# Patient Record
Sex: MALE | Race: WHITE | Employment: FULL TIME | URBAN - METROPOLITAN AREA
[De-identification: names, ages, dates, MRNs, and addresses within clinical notes are randomized per-mention and may not be internally consistent; named-entity substitution may affect disease eponyms.]

---

## 2018-04-10 ENCOUNTER — HOSPITAL ENCOUNTER (OUTPATIENT)
Age: 48
Discharge: HOME | End: 2018-04-12

## 2023-02-06 PROBLEM — R19.8 ABDOMINAL FULLNESS: Status: ACTIVE | Noted: 2023-02-06

## 2023-02-06 PROBLEM — I10 HYPERTENSION: Status: ACTIVE | Noted: 2023-02-06

## 2023-02-06 PROBLEM — M25.512 LEFT SHOULDER PAIN: Status: ACTIVE | Noted: 2023-02-06

## 2023-02-06 PROBLEM — G43.909 MIGRAINES: Status: ACTIVE | Noted: 2023-02-06

## 2023-02-06 PROBLEM — M25.552 LEFT HIP PAIN: Status: ACTIVE | Noted: 2023-02-06

## 2023-02-06 RX ORDER — AMLODIPINE BESYLATE 10 MG/1
1 TABLET ORAL DAILY
COMMUNITY
Start: 2021-05-07 | End: 2024-03-07 | Stop reason: SDUPTHER

## 2023-02-06 RX ORDER — SUMATRIPTAN SUCCINATE 25 MG/1
25 TABLET ORAL ONCE AS NEEDED
COMMUNITY
Start: 2021-05-07 | End: 2024-03-13 | Stop reason: SDUPTHER

## 2023-04-03 ENCOUNTER — OFFICE VISIT (OUTPATIENT)
Dept: PRIMARY CARE | Facility: CLINIC | Age: 53
End: 2023-04-03
Payer: COMMERCIAL

## 2023-04-03 VITALS
SYSTOLIC BLOOD PRESSURE: 140 MMHG | DIASTOLIC BLOOD PRESSURE: 80 MMHG | TEMPERATURE: 97 F | HEART RATE: 84 BPM | WEIGHT: 193.3 LBS | OXYGEN SATURATION: 97 % | BODY MASS INDEX: 28.55 KG/M2

## 2023-04-03 DIAGNOSIS — M54.12 CERVICAL RADICULOPATHY: Primary | ICD-10-CM

## 2023-04-03 DIAGNOSIS — Z12.11 ENCOUNTER FOR SCREENING COLONOSCOPY: ICD-10-CM

## 2023-04-03 DIAGNOSIS — G89.29 CHRONIC LEFT SHOULDER PAIN: ICD-10-CM

## 2023-04-03 DIAGNOSIS — C44.41 BASAL CELL CARCINOMA (BCC) OF SCALP: ICD-10-CM

## 2023-04-03 DIAGNOSIS — M25.512 CHRONIC LEFT SHOULDER PAIN: ICD-10-CM

## 2023-04-03 PROBLEM — S06.6XAA SUBARACHNOID HEMORRHAGE, TRAUMATIC (MULTI): Status: ACTIVE | Noted: 2020-01-31

## 2023-04-03 PROBLEM — S06.4XAA: Status: ACTIVE | Noted: 2020-01-31

## 2023-04-03 PROCEDURE — 3079F DIAST BP 80-89 MM HG: CPT

## 2023-04-03 PROCEDURE — 3077F SYST BP >= 140 MM HG: CPT

## 2023-04-03 PROCEDURE — 99213 OFFICE O/P EST LOW 20 MIN: CPT

## 2023-04-03 ASSESSMENT — ENCOUNTER SYMPTOMS
DEPRESSION: 0
WEAKNESS: 0
DIZZINESS: 0
ABDOMINAL PAIN: 0
OCCASIONAL FEELINGS OF UNSTEADINESS: 0
GASTROINTESTINAL NEGATIVE: 1
HEADACHES: 0
MUSCULOSKELETAL NEGATIVE: 1
LOSS OF SENSATION IN FEET: 0
UNEXPECTED WEIGHT CHANGE: 0
FEVER: 0
PAIN: 1
ACTIVITY CHANGE: 0
ENDOCRINE NEGATIVE: 1
ALLERGIC/IMMUNOLOGIC NEGATIVE: 1
PSYCHIATRIC NEGATIVE: 1
RESPIRATORY NEGATIVE: 1
FATIGUE: 0
CARDIOVASCULAR NEGATIVE: 1
SHORTNESS OF BREATH: 0
HYPERTENSION: 1

## 2023-04-03 NOTE — PROGRESS NOTES
Subjective   Patient ID: Reynaldo Vail is a 52 y.o. male who presents for Hypertension (Reynaldo is here for his hypertension. ) and Pain (When he is sleeping he gets pins an needles. When he fold his arms across his body his arms go numb.).  Taking Amlodipine about 4-5 days weekly with good improvement of BP today  Trouble swallowing medications with warm liquids, no issues with cold liquids.     Left arm numbness when sleeping on it, Hx of GH separation with clavicle fracture.   Pins and needles sensation while sitting on left side.     Bilateral leg swelling early in the day, improves with exercise  Pain level is 0 out of 10  Improves with walking    Skin lesion right side Forehead region, concern for basal cell carcinoma    Hypertension  Pertinent negatives include no chest pain, headaches or shortness of breath.   Pain  Pertinent negatives include no abdominal pain, chest pain, fatigue, fever, headaches, shortness of breath or weakness.       Vitals:    04/03/23 0847   BP: 140/80   Pulse: 84   Temp: 36.1 °C (97 °F)   SpO2: 97%       Review of Systems   Constitutional:  Negative for activity change, fatigue, fever and unexpected weight change.   HENT: Negative.     Respiratory: Negative.  Negative for shortness of breath.    Cardiovascular: Negative.  Negative for chest pain.   Gastrointestinal: Negative.  Negative for abdominal pain.   Endocrine: Negative.    Musculoskeletal: Negative.    Skin: Negative.    Allergic/Immunologic: Negative.    Neurological:  Negative for dizziness, weakness and headaches.   Psychiatric/Behavioral: Negative.         Objective   Physical Exam  Constitutional:       Appearance: Normal appearance.   HENT:      Head: Normocephalic.      Mouth/Throat:      Mouth: Mucous membranes are moist.   Cardiovascular:      Rate and Rhythm: Normal rate and regular rhythm.      Pulses: Normal pulses.      Heart sounds: Normal heart sounds. No murmur heard.     No friction rub. No gallop.   Pulmonary:       Effort: Pulmonary effort is normal. No respiratory distress.      Breath sounds: Normal breath sounds. No wheezing.   Abdominal:      General: Bowel sounds are normal. There is no distension.      Palpations: Abdomen is soft.      Tenderness: There is no abdominal tenderness.   Musculoskeletal:         General: No deformity. Normal range of motion.   Skin:     General: Skin is warm and dry.      Capillary Refill: Capillary refill takes less than 2 seconds.   Neurological:      General: No focal deficit present.      Mental Status: He is alert and oriented to person, place, and time.   Psychiatric:         Mood and Affect: Mood normal.         Assessment/Plan   Problem List Items Addressed This Visit          Musculoskeletal    Left shoulder pain     Other Visit Diagnoses       Cervical radiculopathy    -  Primary    Encounter for screening colonoscopy        Basal cell carcinoma (BCC) of scalp                Time Spent  Prep time on day of patient encounter: 10 minutes  Time spent directly with patient, family or caregiver: 15 minutes  Additional Time Spent on Patient Care Activities: 10 minutes  Documentation Time: 10 minutes  Other Time Spent: 0 minutes  Total: 45 minutes        Thank you for coming in today, please call my office if you have any concerns or questions.     Rhett BREAUX, CNP

## 2023-04-03 NOTE — PATIENT INSTRUCTIONS
Start physical therapy again for Cervical radiculopathy as well as strengthening the back.   Plan for Colonoscopy in the near future    See in another 3 months, if no improvement with PT, see in 1 month.     Thank you for coming in today, if any questions or concerns arise, please call my office.     Rhett Key, SAEID-CNP

## 2023-06-16 ENCOUNTER — TELEPHONE (OUTPATIENT)
Dept: PRIMARY CARE | Facility: CLINIC | Age: 53
End: 2023-06-16
Payer: COMMERCIAL

## 2023-07-03 ENCOUNTER — APPOINTMENT (OUTPATIENT)
Dept: PRIMARY CARE | Facility: CLINIC | Age: 53
End: 2023-07-03
Payer: COMMERCIAL

## 2023-12-28 ENCOUNTER — APPOINTMENT (OUTPATIENT)
Dept: PRIMARY CARE | Facility: CLINIC | Age: 53
End: 2023-12-28
Payer: COMMERCIAL

## 2024-03-07 ENCOUNTER — OFFICE VISIT (OUTPATIENT)
Dept: PRIMARY CARE | Facility: CLINIC | Age: 54
End: 2024-03-07
Payer: COMMERCIAL

## 2024-03-07 VITALS
WEIGHT: 191 LBS | HEART RATE: 61 BPM | HEIGHT: 69 IN | DIASTOLIC BLOOD PRESSURE: 80 MMHG | OXYGEN SATURATION: 98 % | BODY MASS INDEX: 28.29 KG/M2 | SYSTOLIC BLOOD PRESSURE: 130 MMHG | TEMPERATURE: 96.8 F

## 2024-03-07 DIAGNOSIS — L57.0 ACTINIC KERATOSIS: ICD-10-CM

## 2024-03-07 DIAGNOSIS — Z00.00 ROUTINE GENERAL MEDICAL EXAMINATION AT A HEALTH CARE FACILITY: Primary | ICD-10-CM

## 2024-03-07 DIAGNOSIS — I10 PRIMARY HYPERTENSION: ICD-10-CM

## 2024-03-07 DIAGNOSIS — Z12.11 COLON CANCER SCREENING: ICD-10-CM

## 2024-03-07 PROCEDURE — 99213 OFFICE O/P EST LOW 20 MIN: CPT

## 2024-03-07 PROCEDURE — 3075F SYST BP GE 130 - 139MM HG: CPT

## 2024-03-07 PROCEDURE — 99396 PREV VISIT EST AGE 40-64: CPT

## 2024-03-07 PROCEDURE — 3079F DIAST BP 80-89 MM HG: CPT

## 2024-03-07 PROCEDURE — 1036F TOBACCO NON-USER: CPT

## 2024-03-07 RX ORDER — AMLODIPINE BESYLATE 10 MG/1
10 TABLET ORAL DAILY
Qty: 90 TABLET | Refills: 3 | Status: SHIPPED | OUTPATIENT
Start: 2024-03-07 | End: 2025-03-07

## 2024-03-07 ASSESSMENT — ENCOUNTER SYMPTOMS
ENDOCRINE NEGATIVE: 1
DIZZINESS: 1
MUSCULOSKELETAL NEGATIVE: 1
LIGHT-HEADEDNESS: 1
ALLERGIC/IMMUNOLOGIC NEGATIVE: 1
CARDIOVASCULAR NEGATIVE: 1
FATIGUE: 0
ACTIVITY CHANGE: 0
UNEXPECTED WEIGHT CHANGE: 0
SHORTNESS OF BREATH: 0
GASTROINTESTINAL NEGATIVE: 1
SLEEP DISTURBANCE: 1
RESPIRATORY NEGATIVE: 1
WEAKNESS: 0
FEVER: 0
ABDOMINAL PAIN: 0
HEADACHES: 1

## 2024-03-07 ASSESSMENT — PROMIS GLOBAL HEALTH SCALE
RATE_SOCIAL_SATISFACTION: VERY GOOD
RATE_GENERAL_HEALTH: VERY GOOD
RATE_MENTAL_HEALTH: VERY GOOD
CARRYOUT_SOCIAL_ACTIVITIES: VERY GOOD
RATE_PHYSICAL_HEALTH: GOOD
CARRYOUT_PHYSICAL_ACTIVITIES: COMPLETELY
RATE_QUALITY_OF_LIFE: VERY GOOD
RATE_AVERAGE_PAIN: 4
EMOTIONAL_PROBLEMS: RARELY

## 2024-03-07 NOTE — PROGRESS NOTES
Subjective   Patient ID: Reynaldo Vail is a 53 y.o. male who presents for Annual Exam (Reynaldo is here for his annual exam. Would like to discuss getting disbility paperwork filled out to work form home. ).  Plans on going back to work 4 days out of the week.     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  --no limitations to work from home, can work on Zoom Calls.  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    Patient states he had lab work done at employer and declines further lab work  I did ask if patient had PSA drawn he is unsure  Care gaps are met        Vitals:    03/07/24 0815   BP: 130/80   Pulse: 61   Temp: 36 °C (96.8 °F)   SpO2: 98%       Review of Systems   Constitutional:  Negative for activity change, fatigue, fever and unexpected weight change.   HENT: Negative.     Respiratory: Negative.  Negative for shortness of breath.    Cardiovascular: Negative.  Negative for chest pain.   Gastrointestinal: Negative.  Negative for abdominal pain.   Endocrine: Negative.    Musculoskeletal: Negative.    Skin: Negative.    Allergic/Immunologic: Negative.    Neurological:  Positive for dizziness, light-headedness and headaches. Negative for weakness.   Psychiatric/Behavioral:  Positive for sleep disturbance.        Objective   Physical Exam  Vitals and nursing note reviewed.   Constitutional:       Appearance: Normal appearance.   HENT:      Head: Normocephalic.        Comments: Skin lesion of forehead, dermatology referral     Mouth/Throat:      Mouth: Mucous membranes are moist.   Cardiovascular:      Rate and Rhythm: Normal rate and  regular rhythm.      Pulses: Normal pulses.      Heart sounds: Normal heart sounds. No murmur heard.     No friction rub. No gallop.   Pulmonary:      Effort: Pulmonary effort is normal. No respiratory distress.      Breath sounds: Normal breath sounds. No wheezing.   Abdominal:      General: Bowel sounds are normal. There is no distension.      Palpations: Abdomen is soft.      Tenderness: There is no abdominal tenderness.   Musculoskeletal:         General: No deformity. Normal range of motion.   Skin:     General: Skin is warm and dry.      Capillary Refill: Capillary refill takes less than 2 seconds.             Comments: Hemangiomas multiple    Neurological:      General: No focal deficit present.      Mental Status: He is alert and oriented to person, place, and time.   Psychiatric:         Mood and Affect: Mood normal.         Assessment/Plan   Problem List Items Addressed This Visit       Hypertension    Relevant Medications    amLODIPine (Norvasc) 10 mg tablet     Other Visit Diagnoses       Routine general medical examination at a health care facility    -  Primary    Actinic keratosis        Relevant Orders    Referral to Dermatology    Colon cancer screening        Relevant Orders    Colonoscopy Screening; Average Risk Patient                 Thank you for coming in today, please call my office if you have any concerns or questions.     Rhett BREAUX, CNP

## 2024-03-07 NOTE — PATIENT INSTRUCTIONS
Please bring in [facility] blood work    Colonoscopy ordered  See Susan Mayne for Skin Check in [place]    Thank you for coming in today, if any questions or concerns arise, please call my office.   SAEID Costello-CNP

## 2024-03-08 DIAGNOSIS — Z12.11 SCREEN FOR COLON CANCER: ICD-10-CM

## 2024-03-08 RX ORDER — SODIUM PICOSULFATE, MAGNESIUM OXIDE, AND ANHYDROUS CITRIC ACID 12; 3.5; 1 G/175ML; G/175ML; MG/175ML
LIQUID ORAL
Qty: 350 ML | Refills: 0 | Status: SHIPPED | OUTPATIENT
Start: 2024-03-08

## 2024-03-13 DIAGNOSIS — G43.809 OTHER MIGRAINE WITHOUT STATUS MIGRAINOSUS, NOT INTRACTABLE: Primary | ICD-10-CM

## 2024-03-13 RX ORDER — SUMATRIPTAN SUCCINATE 25 MG/1
25 TABLET ORAL ONCE AS NEEDED
Qty: 9 TABLET | Refills: 0 | Status: SHIPPED | OUTPATIENT
Start: 2024-03-13

## 2024-06-17 ENCOUNTER — ANESTHESIA EVENT (OUTPATIENT)
Dept: ANESTHESIOLOGY | Facility: HOSPITAL | Age: 54
End: 2024-06-17

## 2024-06-17 ENCOUNTER — PRE-ADMISSION TESTING (OUTPATIENT)
Dept: PREADMISSION TESTING | Facility: HOSPITAL | Age: 54
End: 2024-06-17
Payer: COMMERCIAL

## 2024-06-17 NOTE — ANESTHESIA PREPROCEDURE EVALUATION
Patient: Reynaldo Vail    Procedure Information    Date: 06/17/24  Reason: PAT         Relevant Problems   Cardiac   (+) Hypertension      Neuro   (+) Migraines   (+) Traumatic intracranial epidural hematoma (Multi)       Clinical information reviewed:   Tobacco  Allergies  Meds   Med Hx  Surg Hx   Fam Hx          NPO Detail:  No data recorded     PHYSICAL EXAM    Anesthesia Plan    History of general anesthesia?: unknown/emergency  History of complications of general anesthesia?: unknown/emergency    ASA 2     MAC

## 2024-07-08 ENCOUNTER — APPOINTMENT (OUTPATIENT)
Dept: GASTROENTEROLOGY | Facility: HOSPITAL | Age: 54
End: 2024-07-08
Payer: COMMERCIAL

## 2024-10-09 ENCOUNTER — OFFICE VISIT (OUTPATIENT)
Dept: PRIMARY CARE | Facility: CLINIC | Age: 54
End: 2024-10-09
Payer: COMMERCIAL

## 2024-10-09 VITALS
WEIGHT: 196.1 LBS | BODY MASS INDEX: 28.96 KG/M2 | SYSTOLIC BLOOD PRESSURE: 154 MMHG | OXYGEN SATURATION: 97 % | TEMPERATURE: 96.9 F | DIASTOLIC BLOOD PRESSURE: 98 MMHG | HEART RATE: 93 BPM

## 2024-10-09 DIAGNOSIS — J01.00 ACUTE NON-RECURRENT MAXILLARY SINUSITIS: Primary | ICD-10-CM

## 2024-10-09 PROCEDURE — 1036F TOBACCO NON-USER: CPT

## 2024-10-09 PROCEDURE — 3080F DIAST BP >= 90 MM HG: CPT

## 2024-10-09 PROCEDURE — 3077F SYST BP >= 140 MM HG: CPT

## 2024-10-09 PROCEDURE — 99214 OFFICE O/P EST MOD 30 MIN: CPT

## 2024-10-09 RX ORDER — AMOXICILLIN AND CLAVULANATE POTASSIUM 875; 125 MG/1; MG/1
875 TABLET, FILM COATED ORAL 2 TIMES DAILY
Qty: 20 TABLET | Refills: 0 | Status: SHIPPED | OUTPATIENT
Start: 2024-10-09 | End: 2024-10-19

## 2024-10-09 NOTE — PROGRESS NOTES
Subjective   Patient ID: Reynaldo Vail is a 54 y.o. male who presents for Sinus Problem (About a month- started out as clear mucus then turned dark yellowish, two weeks ago turned pus like with some blood, sinus pain and tooth pain//Taking OTC medications with little relief) and Cough (Occasionally productive).  Subjective  Reynaldo Vail is a 54 y.o. male who presents for evaluation of sinus pain. Symptoms include: congestion, cough, facial pain, foul breath, and foul rhinorrhea. Onset of symptoms was a few weeks ago. Symptoms have been gradually worsening since that time. Past history is significant for no history of pneumonia or bronchitis. Patient is a non-smoker.    Objective  [unfilled]     Assessment/Plan  Acute bacterial sinusitis.    abx          Vitals:    10/09/24 1116   BP: (!) 154/98   Pulse: 93   Temp: 36.1 °C (96.9 °F)   SpO2: 97%       Review of Systems    Objective   Physical Exam  Constitutional:       General: He is not in acute distress.     Appearance: Normal appearance.   HENT:      Head: Normocephalic.      Right Ear: Tympanic membrane normal.      Left Ear: Tympanic membrane normal.      Nose: Congestion and rhinorrhea present.      Mouth/Throat:      Pharynx: Oropharyngeal exudate and posterior oropharyngeal erythema present.   Eyes:      Extraocular Movements: Extraocular movements intact.      Pupils: Pupils are equal, round, and reactive to light.   Cardiovascular:      Rate and Rhythm: Normal rate and regular rhythm.   Pulmonary:      Effort: No respiratory distress.   Abdominal:      General: Abdomen is flat. There is no distension.   Skin:     General: Skin is warm and dry.      Capillary Refill: Capillary refill takes less than 2 seconds.   Neurological:      Mental Status: He is alert.   Psychiatric:         Mood and Affect: Mood normal.         Assessment/Plan   Problem List Items Addressed This Visit    None  Visit Diagnoses       Acute non-recurrent maxillary sinusitis    -  Primary     Relevant Medications    amoxicillin-pot clavulanate (Augmentin) 875-125 mg tablet                 Thank you for coming in today, please call my office if you have any concerns or questions.     Rhett BREAUX, CNP

## 2024-11-11 ENCOUNTER — APPOINTMENT (OUTPATIENT)
Dept: DERMATOLOGY | Facility: CLINIC | Age: 54
End: 2024-11-11
Payer: COMMERCIAL

## 2024-11-11 DIAGNOSIS — L21.9 SEBORRHEIC DERMATITIS: ICD-10-CM

## 2024-11-11 DIAGNOSIS — D48.5 NEOPLASM OF UNCERTAIN BEHAVIOR OF SKIN: Primary | ICD-10-CM

## 2024-11-11 DIAGNOSIS — L30.9 DERMATITIS: ICD-10-CM

## 2024-11-11 PROCEDURE — 99213 OFFICE O/P EST LOW 20 MIN: CPT | Performed by: NURSE PRACTITIONER

## 2024-11-11 RX ORDER — FLUOCINONIDE TOPICAL SOLUTION USP, 0.05% 0.5 MG/ML
SOLUTION TOPICAL
Qty: 60 ML | Refills: 3 | Status: SHIPPED | OUTPATIENT
Start: 2024-11-11

## 2024-11-13 NOTE — PROGRESS NOTES
Subjective     Reynaldo Vail is a 54 y.o. male who presents for the following: Skin Check (Waist up skin check- referred by Rhett Key CNP. Pt has lesions of concern to forehead, chest and back. Pt reports personal hx of BCC (~10 years ago). ).     Review of Systems:  No other skin or systemic complaints other than what is documented elsewhere in the note.    The following portions of the chart were reviewed this encounter and updated as appropriate:  Tobacco  Allergies  Meds  Problems  Med Hx  Surg Hx  Fam Hx         Skin Cancer History  No skin cancer on file.      Specialty Problems    None       Objective   Well appearing patient in no apparent distress; mood and affect are within normal limits.    A full examination was performed including scalp, head, eyes, ears, nose, lips, neck, chest, axillae, abdomen, back, buttocks, bilateral upper extremities, bilateral lower extremities, hands, feet, fingers, toes, fingernails, and toenails. All findings within normal limits unless otherwise noted below.    Assessment/Plan   1. Neoplasm of uncertain behavior of skin  Mid Forehead  1.0 cm pink, smooth plaque              Lesion biopsy  Type of biopsy: tangential    Informed consent: discussed and consent obtained    Timeout: patient name, date of birth, surgical site, and procedure verified    Procedure prep:  Patient was prepped and draped  Anesthesia: the lesion was anesthetized in a standard fashion    Anesthetic:  1% lidocaine w/ epinephrine 1-100,000 local infiltration  Instrument used: DermaBlade    Hemostasis achieved with: aluminum chloride    Outcome: patient tolerated procedure well    Post-procedure details: sterile dressing applied and wound care instructions given    Dressing type: petrolatum and bandage      Staff Communication: Dermatology Local Anesthesia: 1 % Lidocaine / Epinephrine - Amount:    Specimen 1 - Dermatopathology- DERM LAB  Differential Diagnosis: r/o nmsc  Check Margins Yes/No?:   "  Comments:    Dermpath Lab: Routine Histopathology (formalin-fixed tissue)    -  Discussed differential with patient.   - Given uncertainty of clinical diagnosis, a biopsy is recommended in clinic today.   - The patient expressed understanding, is in agreement with this plan, and wishes to proceed with biopsy.   - Oral and written wound care instructions provided.   - Advised the patient that the office will call within 2 weeks to discuss biopsy results.     2. Dermatitis    Related Medications  fluocinonide (Lidex) 0.05 % external solution  To scalp daily as needed for itch/inflammation.    3. Seborrheic dermatitis  Scalp  Erythema with overlying greasy scale.    -Discussed the nature of the diagnosis  -There is no \"cure\" for this condition. Treatments will need to be continued long term to treat the condition  -Recommend: Start fluocinonide solution, use as directed.   - Risks, benefits, and side effects discussed. Patient understood and agrees with the plan.       Follow up ior a total body skin exam. Please call me if there are any changes or development of concerning symptoms (lesion/skin condition is changing, bleeding, enlarging, or worsening).        "

## 2024-11-14 LAB
LABORATORY COMMENT REPORT: NORMAL
PATH REPORT.FINAL DX SPEC: NORMAL
PATH REPORT.GROSS SPEC: NORMAL
PATH REPORT.MICROSCOPIC SPEC OTHER STN: NORMAL
PATH REPORT.RELEVANT HX SPEC: NORMAL
PATH REPORT.TOTAL CANCER: NORMAL

## 2024-11-15 DIAGNOSIS — C44.319 BASAL CELL CARCINOMA (BCC) OF FOREHEAD: Primary | ICD-10-CM

## 2024-11-15 NOTE — RESULT ENCOUNTER NOTE
Pt was contacted and notified of malignant results at this time.  Pt is agreeable to treatment plan. No further questions noted.     Mini Robles RN

## 2024-12-09 ENCOUNTER — APPOINTMENT (OUTPATIENT)
Dept: DERMATOLOGY | Facility: CLINIC | Age: 54
End: 2024-12-09
Payer: COMMERCIAL

## 2024-12-09 VITALS — HEART RATE: 73 BPM | SYSTOLIC BLOOD PRESSURE: 148 MMHG | DIASTOLIC BLOOD PRESSURE: 102 MMHG

## 2024-12-09 DIAGNOSIS — C44.319 BASAL CELL CARCINOMA OF FOREHEAD: ICD-10-CM

## 2024-12-09 PROCEDURE — 13131 CMPLX RPR F/C/C/M/N/AX/G/H/F: CPT | Performed by: DERMATOLOGY

## 2024-12-09 PROCEDURE — 99214 OFFICE O/P EST MOD 30 MIN: CPT | Performed by: DERMATOLOGY

## 2024-12-09 PROCEDURE — 17311 MOHS 1 STAGE H/N/HF/G: CPT | Performed by: DERMATOLOGY

## 2024-12-09 NOTE — PROGRESS NOTES
Office Visit Note  Date: 12/9/2024  Surgeon:  Umm Paz MD  Office Location:  [address] [facility]  [address]  [address]  [facility] [address]  Dept: [phone]  Dept Fax: [phone]  Referring Provider: Susan L Mayne, APRN-CNP  [address]  [place] [address]    [facility]   Reynaldo Vail is a 54 y.o. male who presents for the following: MOHS Surgery    According to the patient, the lesion has been present for approximately greater than 1 year at the time of diagnosis.  The lesion is not causing symptoms.  The lesion has not been treated previously.    The patient does not have a pacemaker / defibrillator.  The patient does not have a heart valve / joint replacement.    The patient is not on blood thinners.  The patient does not have a history of hepatitis B or C.  The patient does not have a history of HIV.  The patient does not have a history of immunosuppression (e.g. organ transplantation, malignancy, medications)    Review of Systems:  No other skin or systemic complaints other than what is documented elsewhere in the note.    MEDICAL HISTORY: clinically relevant history including significant past medical history, medications and allergies was reviewed and documented in Epic.    Objective   Well appearing patient in no apparent distress; mood and affect are within normal limits.  Vital signs: See record.  Noted on the Mid Forehead  Is a 1.4 x 1.2 cm scar    The patient confirmed the identified site.    Discussion:  The nature of the diagnosis was explained. The lesion is a skin cancer.  It has a risk of local growth and distant spread. The condition is associated with sun exposure.  Warning signs of non-melanoma skin cancer discussed. Patient was instructed to perform monthly self skin examination.  We recommended that the patient have regular full skin exams given an increased risk of subsequent skin cancers. The patient was instructed to use sun protective  behaviors including use of broad spectrum sunscreens and sun protective clothing to reduce risk of skin cancers.      Risks, benefits, side effects of Mohs surgery were discussed with patient and the patient voiced understanding.  It was explained that even though the cure rate of Mohs is very high it is not 100%. Risks of surgery including but not limited to bleeding, infection, numbness, nerve damage, and scar were reviewed.  Discussion included wound care requirements, activity restrictions, likely scar outcome and time to heal.     After Mohs surgery, the defect may need to be repaired surgically and the scar may be longer than the original lesion.  Reconstruction options, risks, and benefits were reviewed including second intention healing, linear repair (4-1 ratio was explained), local flaps, skin grafts, cartilage grafts and interpolation flaps (the need for multiple surgeries was explained). Possible outcomes were reviewed including likely scar appearance, failure of flap survival, infection, bleeding and the need for revision surgery.     The pathology was reviewed, the photograph was reviewed, and the referring physician's note was reviewed.    Patient elected for Mohs surgery.

## 2024-12-09 NOTE — PROGRESS NOTES
Mohs Surgery Operative Note    Date of Surgery:  12/9/2024  Surgeon:  Umm Paz MD  Office Location:  [address] [facility]  [address]  [address]  [facility] [address]  Dept: [phone]  Dept Fax: [phone]  Referring Provider: Susan L Mayne, APRN-CNP  [address]  [place] [address]      Assessment/Plan   Pre-procedure:   Obtained informed consent: written from patient  The surgical site was identified and confirmed with the patient.     Intra-operative:   Audible time out called at : 8:42 AM 12/09/24  by: Yasmeen De La Fuente MA   Verified patient name, birthdate, site, specimen bottle label & requisition.    The planned procedure(s) was again reviewed with the patient. The risks of bleeding, infection, nerve damage and scarring were reviewed. Written authorization was obtained. The patient identity, surgical site, and planned procedure(s) were verified. The provider acted as both surgeon and pathologist.     Basal cell carcinoma of forehead  Mid Forehead  Mohs surgery  Consent obtained: written    Universal Protocol:  Procedure explained and questions answered to patient or proxy's satisfaction: Yes    Test results available and properly labeled: Yes    Pathology report reviewed: Yes    External notes reviewed: Yes    Photo or diagram used for site identification: Yes    Site/side marked: Yes    Slide independently reviewed by Mohs surgeon: Yes    Immediately prior to procedure a time out was called: Yes    Patient identity confirmed: verbally with patient  Preparation: Patient was prepped and draped in usual sterile fashion      Anticoagulation:  Is the patient taking prescription anticoagulant and/or aspirin prescribed/recommended by a physician? No    Was the anticoagulation regimen changed prior to Mohs? No      Anesthesia:  Anesthesia method: local infiltration  Local anesthetic: lidocaine 2% WITH epi    Procedure Details:  Case ID Number: [id number]  Biopsy  accession number: [id number]  Date of biopsy: 11/11/2024  Pre-Op diagnosis: basal cell carcinoma  BCC subtype: nodular  Surgical site (from skin exam): Mid Forehead  Pre-operative length (cm): 1.4  Pre-operative width (cm): 1.2  Indications for Mohs surgery: anatomic location where tissue conservation is critical  Previously treated? No      Micrographic Surgery Details:  Post-operative length (cm): 1.7  Post-operative width (cm): 1.5  Number of Mohs stages: 1    Stage 1     Comments: The patient was brought into the operating room and placed in the procedure chair in the appropriate position.  The area positive by previous biopsy was identified and confirmed with the patient. The area of clinically obvious tumor was debulked using a curette and/or scalpel as needed. An incision was made following the Mohs approach through the skin. The specimen was taken to the lab, divided into 2 piece(s) and appropriately chromacoded and processed.     Tumor features identified on Mohs section: no tumor identified  Depth of defect: subcutaneous fat    Patient tolerance of procedure: tolerated well, no immediate complications    Reconstruction:  Was the defect reconstructed? Yes    Was reconstruction performed by the same Mohs surgeon? Yes    Setting of reconstruction: outpatient office  When was reconstruction performed? same day  Type of reconstruction: linear  Linear reconstruction: complex  Length of linear repair (cm): 2.5  Subcutaneous Layers (Deep Stitches)   Suture size:  4-0  Suture type:  Vicryl  Stitches:  Buried vertical mattress  Fine/surface layer approximation (top stitches)   Epidermal/Superficial suture size:  5-0  Epidermal/Superficial suture type:  Fast-absorbing gut  Stitches: simple running    Hemostasis achieved with: electrodesiccation  Outcome: patient tolerated procedure well with no complications    Post-procedure details: sterile dressing applied and wound care instructions given    Dressing type:  pressure dressing      Complex Linear Repair - Wide Undermining:  Given the location and size of the defect, it was determined that a complex layered linear closure was required to restore normal anatomy and function. The repair was considered complex because extensive undermining was required and performed. The amount of undermining performed was greater than the maximum width of the defect as measured perpendicular to the closure line along at least one entire edge of the defect. Standing cutaneous cones were removed using Burow's triangles. The wound edges were brought into close approximation with buried vertical mattress sutures. The remainder of the wound was then closed with epidermal top sutures.    The final repair measured 2.5 cm                Wound care was discussed, and the patient was given written post-operative wound care instructions.      The patient will follow up with Umm Paz MD as needed for any post operative problems or concerns, and will follow up with their primary dermatologist as scheduled.

## 2025-03-11 ENCOUNTER — APPOINTMENT (OUTPATIENT)
Dept: PRIMARY CARE | Facility: CLINIC | Age: 55
End: 2025-03-11
Payer: COMMERCIAL

## 2025-03-11 VITALS
TEMPERATURE: 97.7 F | HEART RATE: 84 BPM | SYSTOLIC BLOOD PRESSURE: 154 MMHG | DIASTOLIC BLOOD PRESSURE: 106 MMHG | BODY MASS INDEX: 28.72 KG/M2 | HEIGHT: 69 IN | WEIGHT: 193.9 LBS | OXYGEN SATURATION: 95 %

## 2025-03-11 DIAGNOSIS — G43.809 OTHER MIGRAINE WITHOUT STATUS MIGRAINOSUS, NOT INTRACTABLE: ICD-10-CM

## 2025-03-11 DIAGNOSIS — Z13.220 SCREENING FOR HYPERLIPIDEMIA: ICD-10-CM

## 2025-03-11 DIAGNOSIS — Z12.12 ENCOUNTER FOR COLORECTAL CANCER SCREENING: ICD-10-CM

## 2025-03-11 DIAGNOSIS — Z13.29 THYROID DISORDER SCREENING: ICD-10-CM

## 2025-03-11 DIAGNOSIS — Z12.11 ENCOUNTER FOR COLORECTAL CANCER SCREENING: ICD-10-CM

## 2025-03-11 DIAGNOSIS — E55.9 VITAMIN D DEFICIENCY: Primary | ICD-10-CM

## 2025-03-11 DIAGNOSIS — Z13.0 SCREENING FOR DEFICIENCY ANEMIA: ICD-10-CM

## 2025-03-11 DIAGNOSIS — I10 PRIMARY HYPERTENSION: ICD-10-CM

## 2025-03-11 DIAGNOSIS — Z13.1 DIABETES MELLITUS SCREENING: ICD-10-CM

## 2025-03-11 DIAGNOSIS — Z00.00 HEALTHCARE MAINTENANCE: ICD-10-CM

## 2025-03-11 DIAGNOSIS — Z12.5 PROSTATE CANCER SCREENING: ICD-10-CM

## 2025-03-11 DIAGNOSIS — Z13.89 SCREENING FOR MULTIPLE CONDITIONS: ICD-10-CM

## 2025-03-11 PROCEDURE — 1036F TOBACCO NON-USER: CPT

## 2025-03-11 PROCEDURE — 3077F SYST BP >= 140 MM HG: CPT

## 2025-03-11 PROCEDURE — 3008F BODY MASS INDEX DOCD: CPT

## 2025-03-11 PROCEDURE — 3080F DIAST BP >= 90 MM HG: CPT

## 2025-03-11 PROCEDURE — 99396 PREV VISIT EST AGE 40-64: CPT

## 2025-03-11 RX ORDER — SUMATRIPTAN SUCCINATE 25 MG/1
25 TABLET ORAL ONCE AS NEEDED
Qty: 9 TABLET | Refills: 3 | Status: SHIPPED | OUTPATIENT
Start: 2025-03-11

## 2025-03-11 RX ORDER — AMLODIPINE BESYLATE 10 MG/1
10 TABLET ORAL DAILY
Qty: 90 TABLET | Refills: 3 | Status: SHIPPED | OUTPATIENT
Start: 2025-03-11 | End: 2026-03-11

## 2025-03-11 ASSESSMENT — ENCOUNTER SYMPTOMS
UNEXPECTED WEIGHT CHANGE: 0
WEAKNESS: 0
MUSCULOSKELETAL NEGATIVE: 1
ENDOCRINE NEGATIVE: 1
CARDIOVASCULAR NEGATIVE: 1
ACTIVITY CHANGE: 0
HEADACHES: 1
ALLERGIC/IMMUNOLOGIC NEGATIVE: 1
RESPIRATORY NEGATIVE: 1
FATIGUE: 0
PSYCHIATRIC NEGATIVE: 1
SHORTNESS OF BREATH: 0
FEVER: 0
GASTROINTESTINAL NEGATIVE: 1
DIZZINESS: 0
ABDOMINAL PAIN: 0

## 2025-03-11 ASSESSMENT — PROMIS GLOBAL HEALTH SCALE
EMOTIONAL_PROBLEMS: NEVER
RATE_QUALITY_OF_LIFE: VERY GOOD
RATE_GENERAL_HEALTH: VERY GOOD
RATE_AVERAGE_PAIN: 3
RATE_MENTAL_HEALTH: GOOD
RATE_SOCIAL_SATISFACTION: VERY GOOD
RATE_PHYSICAL_HEALTH: VERY GOOD
CARRYOUT_PHYSICAL_ACTIVITIES: MODERATELY
CARRYOUT_SOCIAL_ACTIVITIES: VERY GOOD
RATE_AVERAGE_FATIGUE: MILD

## 2025-03-11 ASSESSMENT — PATIENT HEALTH QUESTIONNAIRE - PHQ9
SUM OF ALL RESPONSES TO PHQ9 QUESTIONS 1 AND 2: 0
1. LITTLE INTEREST OR PLEASURE IN DOING THINGS: NOT AT ALL
2. FEELING DOWN, DEPRESSED OR HOPELESS: NOT AT ALL

## 2025-03-11 NOTE — PROGRESS NOTES
Subjective   Patient ID: Reynaldo Vail is a 54 y.o. male who presents for Annual Exam.    Subjective  Reynaldo Vail is a 54 y.o. male and is here for a comprehensive physical exam. The patient reports no problems.    Recently had skin tag removal basal cell carcinoma    Do you take any herbs or supplements that were not prescribed by a doctor? no  Are you taking calcium supplements? no  Are you taking aspirin daily? no      Do you have pain that bothers you in your daily life? no  [unfilled]     Objective  [unfilled]    Assessment/Plan  Healthy male exam.      1. BP slightly up today, recheck 3 months MA visit, he drinks caffeine 3 cups of coffee in the AM  Has a designated exercise regimen starting soon and healthy diet continues.  2. Patient Counseling:  --Nutrition: Stressed importance of moderation in sodium/caffeine intake, saturated fat and cholesterol, caloric balance, sufficient intake of fresh fruits, vegetables, fiber, calcium, iron, and 1 mg of folate supplement per day (for females capable of pregnancy).  --Exercise: Stressed the importance of regular exercise.   --Injury prevention: Discussed safety belts, safety helmets, smoke detector, smoking near bedding or upholstery.   --Dental health: Discussed importance of regular tooth brushing, flossing, and dental visits.  --Immunizations reviewed.  --Discussed benefits of screening colonoscopy. -- cologuard  --After hours service discussed with patient  3. Discussed the patient's BMI with him.  The BMI is in the acceptable range.  4. Follow up as needed for acute illness          Vitals:    03/11/25 0858   BP: (!) 154/106   Pulse: 84   Temp: 36.5 °C (97.7 °F)   SpO2: 95%       Review of Systems   Constitutional:  Negative for activity change, fatigue, fever and unexpected weight change.   HENT: Negative.     Respiratory: Negative.  Negative for shortness of breath.    Cardiovascular: Negative.  Negative for chest pain.   Gastrointestinal: Negative.  Negative for  abdominal pain.   Endocrine: Negative.    Musculoskeletal: Negative.    Skin: Negative.    Allergic/Immunologic: Negative.    Neurological:  Positive for headaches. Negative for dizziness and weakness.        Migraines, better since surgery sumatriptan helps breakthrough migraine.   Psychiatric/Behavioral: Negative.         Objective   Physical Exam  Vitals and nursing note reviewed.   Constitutional:       Appearance: Normal appearance.   HENT:      Head: Normocephalic.      Mouth/Throat:      Mouth: Mucous membranes are moist.   Cardiovascular:      Rate and Rhythm: Normal rate and regular rhythm.      Pulses: Normal pulses.      Heart sounds: Normal heart sounds. No murmur heard.     No friction rub. No gallop.   Pulmonary:      Effort: Pulmonary effort is normal. No respiratory distress.      Breath sounds: Normal breath sounds. No wheezing.   Abdominal:      General: Bowel sounds are normal. There is no distension.      Palpations: Abdomen is soft.      Tenderness: There is no abdominal tenderness.   Musculoskeletal:         General: No deformity. Normal range of motion.   Skin:     General: Skin is warm and dry.      Capillary Refill: Capillary refill takes less than 2 seconds.   Neurological:      General: No focal deficit present.      Mental Status: He is alert and oriented to person, place, and time.   Psychiatric:         Mood and Affect: Mood normal.         Assessment/Plan   Problem List Items Addressed This Visit       Hypertension    Relevant Medications    amLODIPine (Norvasc) 10 mg tablet    Migraines    Relevant Medications    SUMAtriptan (Imitrex) 25 mg tablet     Other Visit Diagnoses       Vitamin D deficiency    -  Primary    Relevant Orders    Vitamin D 25-Hydroxy,Total (for eval of Vitamin D levels)    Screening for multiple conditions        Relevant Orders    CBC and Auto Differential    Healthcare maintenance        Relevant Orders    Comprehensive Metabolic Panel    Screening for  hyperlipidemia        Relevant Orders    Lipid Panel    Thyroid disorder screening        Relevant Orders    TSH with reflex to Free T4 if abnormal    Screening for deficiency anemia        Relevant Orders    Vitamin B12    Diabetes mellitus screening        Relevant Orders    Hemoglobin A1C    Prostate cancer screening        Relevant Orders    PSA, Total and Free    Encounter for colorectal cancer screening        Relevant Orders    Cologuard® colon cancer screening                 Thank you for coming in today, please call my office if you have any concerns or questions.     Rhett BREAUX, CNP

## 2025-03-11 NOTE — PATIENT INSTRUCTIONS
Labs today     See yearly  Cologuard ordered.    Exercise often, aerobic exercise 4-5 days per week  3 month BP check MA visit.    Thank you for coming in today, if any questions or concerns arise, please call my office.   SAEID Costello-CNP

## 2025-03-13 LAB
25(OH)D3+25(OH)D2 SERPL-MCNC: 41 NG/ML (ref 30–100)
ALBUMIN SERPL-MCNC: 4.6 G/DL (ref 3.6–5.1)
ALP SERPL-CCNC: 65 U/L (ref 35–144)
ALT SERPL-CCNC: 37 U/L (ref 9–46)
ANION GAP SERPL CALCULATED.4IONS-SCNC: 9 MMOL/L (CALC) (ref 7–17)
AST SERPL-CCNC: 21 U/L (ref 10–35)
BASOPHILS # BLD AUTO: 29 CELLS/UL (ref 0–200)
BASOPHILS NFR BLD AUTO: 0.7 %
BILIRUB SERPL-MCNC: 0.5 MG/DL (ref 0.2–1.2)
BUN SERPL-MCNC: 16 MG/DL (ref 7–25)
CALCIUM SERPL-MCNC: 9.9 MG/DL (ref 8.6–10.3)
CHLORIDE SERPL-SCNC: 105 MMOL/L (ref 98–110)
CHOLEST SERPL-MCNC: 226 MG/DL
CHOLEST/HDLC SERPL: 4 (CALC)
CO2 SERPL-SCNC: 28 MMOL/L (ref 20–32)
CREAT SERPL-MCNC: 0.93 MG/DL (ref 0.7–1.3)
EGFRCR SERPLBLD CKD-EPI 2021: 99 ML/MIN/1.73M2
EOSINOPHIL # BLD AUTO: 111 CELLS/UL (ref 15–500)
EOSINOPHIL NFR BLD AUTO: 2.7 %
ERYTHROCYTE [DISTWIDTH] IN BLOOD BY AUTOMATED COUNT: 12.5 % (ref 11–15)
EST. AVERAGE GLUCOSE BLD GHB EST-MCNC: 108 MG/DL
EST. AVERAGE GLUCOSE BLD GHB EST-SCNC: 6 MMOL/L
GLUCOSE SERPL-MCNC: 88 MG/DL (ref 65–99)
HBA1C MFR BLD: 5.4 % OF TOTAL HGB
HCT VFR BLD AUTO: 44.5 % (ref 38.5–50)
HDLC SERPL-MCNC: 56 MG/DL
HGB BLD-MCNC: 14.5 G/DL (ref 13.2–17.1)
LDLC SERPL CALC-MCNC: 150 MG/DL (CALC)
LYMPHOCYTES # BLD AUTO: 1304 CELLS/UL (ref 850–3900)
LYMPHOCYTES NFR BLD AUTO: 31.8 %
MCH RBC QN AUTO: 29.1 PG (ref 27–33)
MCHC RBC AUTO-ENTMCNC: 32.6 G/DL (ref 32–36)
MCV RBC AUTO: 89.4 FL (ref 80–100)
MONOCYTES # BLD AUTO: 349 CELLS/UL (ref 200–950)
MONOCYTES NFR BLD AUTO: 8.5 %
NEUTROPHILS # BLD AUTO: 2308 CELLS/UL (ref 1500–7800)
NEUTROPHILS NFR BLD AUTO: 56.3 %
NONHDLC SERPL-MCNC: 170 MG/DL (CALC)
PLATELET # BLD AUTO: 225 THOUSAND/UL (ref 140–400)
PMV BLD REES-ECKER: 10.7 FL (ref 7.5–12.5)
POTASSIUM SERPL-SCNC: 4.7 MMOL/L (ref 3.5–5.3)
PROT SERPL-MCNC: 7.1 G/DL (ref 6.1–8.1)
PSA FREE MFR SERPL: 22 % (CALC)
PSA FREE SERPL-MCNC: 0.2 NG/ML
PSA SERPL-MCNC: 0.9 NG/ML
RBC # BLD AUTO: 4.98 MILLION/UL (ref 4.2–5.8)
SODIUM SERPL-SCNC: 142 MMOL/L (ref 135–146)
TRIGL SERPL-MCNC: 90 MG/DL
TSH SERPL-ACNC: 2.91 MIU/L (ref 0.4–4.5)
VIT B12 SERPL-MCNC: 509 PG/ML (ref 200–1100)
WBC # BLD AUTO: 4.1 THOUSAND/UL (ref 3.8–10.8)

## 2025-04-01 LAB — NONINV COLON CA DNA+OCC BLD SCRN STL QL: NEGATIVE

## 2025-04-10 ENCOUNTER — APPOINTMENT (OUTPATIENT)
Dept: PRIMARY CARE | Facility: CLINIC | Age: 55
End: 2025-04-10
Payer: COMMERCIAL

## 2025-04-10 VITALS
DIASTOLIC BLOOD PRESSURE: 102 MMHG | HEART RATE: 91 BPM | WEIGHT: 191 LBS | TEMPERATURE: 97 F | BODY MASS INDEX: 28.21 KG/M2 | SYSTOLIC BLOOD PRESSURE: 144 MMHG | OXYGEN SATURATION: 97 %

## 2025-04-10 DIAGNOSIS — G43.E11 INTRACTABLE CHRONIC MIGRAINE WITH AURA WITH STATUS MIGRAINOSUS: Primary | ICD-10-CM

## 2025-04-10 PROCEDURE — 99212 OFFICE O/P EST SF 10 MIN: CPT

## 2025-04-10 PROCEDURE — 3080F DIAST BP >= 90 MM HG: CPT

## 2025-04-10 PROCEDURE — 3077F SYST BP >= 140 MM HG: CPT

## 2025-04-10 NOTE — PROGRESS NOTES
Subjective   Patient ID: Reynaldo Vail is a 54 y.o. male who presents for F2f (Face to face for ADA accommodation to allow him to work from home).    This is a face to face for work from home accomodation  Based on [employer] disability.     Unable to drive to work due to migraines, insomnia  He has worked from home for years, and has been equally productive throughout his time working from home.            Vitals:    04/10/25 0908   BP: (!) 144/102   Pulse: 91   Temp: 36.1 °C (97 °F)   SpO2: 97%       Review of Systems    Objective   Physical Exam    Assessment/Plan   Problem List Items Addressed This Visit       Migraines - Primary            Thank you for coming in today, please call my office if you have any concerns or questions.     Rhett BREAUX, CNP

## 2025-04-10 NOTE — PATIENT INSTRUCTIONS
We will send over paperwork today for accomodation  Return to taking amlodipine for BP  Healthy diet and exercise    Thank you for coming in today, if any questions or concerns arise, please call my office.   SAEID Costello-CNP

## 2025-06-09 ENCOUNTER — APPOINTMENT (OUTPATIENT)
Dept: DERMATOLOGY | Facility: CLINIC | Age: 55
End: 2025-06-09
Payer: COMMERCIAL

## 2025-06-11 ENCOUNTER — APPOINTMENT (OUTPATIENT)
Dept: PRIMARY CARE | Facility: CLINIC | Age: 55
End: 2025-06-11
Payer: COMMERCIAL

## 2025-06-11 VITALS — SYSTOLIC BLOOD PRESSURE: 124 MMHG | DIASTOLIC BLOOD PRESSURE: 86 MMHG
